# Patient Record
Sex: FEMALE | Race: BLACK OR AFRICAN AMERICAN | NOT HISPANIC OR LATINO | Employment: PART TIME | URBAN - METROPOLITAN AREA
[De-identification: names, ages, dates, MRNs, and addresses within clinical notes are randomized per-mention and may not be internally consistent; named-entity substitution may affect disease eponyms.]

---

## 2023-06-06 ENCOUNTER — LAB (OUTPATIENT)
Dept: LAB | Facility: LAB | Age: 28
End: 2023-06-06
Payer: COMMERCIAL

## 2023-06-06 ENCOUNTER — OFFICE VISIT (OUTPATIENT)
Dept: PRIMARY CARE | Facility: CLINIC | Age: 28
End: 2023-06-06
Payer: COMMERCIAL

## 2023-06-06 VITALS
WEIGHT: 168 LBS | DIASTOLIC BLOOD PRESSURE: 70 MMHG | HEIGHT: 65 IN | HEART RATE: 83 BPM | SYSTOLIC BLOOD PRESSURE: 100 MMHG | BODY MASS INDEX: 27.99 KG/M2 | OXYGEN SATURATION: 98 %

## 2023-06-06 DIAGNOSIS — D64.9 ANEMIA, UNSPECIFIED TYPE: ICD-10-CM

## 2023-06-06 DIAGNOSIS — Z00.00 HEALTH CARE MAINTENANCE: ICD-10-CM

## 2023-06-06 DIAGNOSIS — Z00.00 HEALTH CARE MAINTENANCE: Primary | ICD-10-CM

## 2023-06-06 PROBLEM — R87.610 ASCUS WITH POSITIVE HIGH RISK HPV CERVICAL: Status: ACTIVE | Noted: 2023-06-06

## 2023-06-06 PROBLEM — N89.8 VAGINAL DISCHARGE: Status: ACTIVE | Noted: 2023-06-06

## 2023-06-06 PROBLEM — R39.9 UTI SYMPTOMS: Status: ACTIVE | Noted: 2023-06-06

## 2023-06-06 PROBLEM — R10.2 PELVIC PAIN: Status: ACTIVE | Noted: 2023-06-06

## 2023-06-06 PROBLEM — N93.9 ABNORMAL UTERINE BLEEDING: Status: ACTIVE | Noted: 2023-06-06

## 2023-06-06 PROBLEM — R87.810 ASCUS WITH POSITIVE HIGH RISK HPV CERVICAL: Status: ACTIVE | Noted: 2023-06-06

## 2023-06-06 PROBLEM — N76.0 BACTERIAL VAGINOSIS: Status: ACTIVE | Noted: 2023-06-06

## 2023-06-06 PROBLEM — B97.7 HPV (HUMAN PAPILLOMA VIRUS) INFECTION: Status: ACTIVE | Noted: 2023-06-06

## 2023-06-06 PROBLEM — B96.89 BACTERIAL VAGINOSIS: Status: ACTIVE | Noted: 2023-06-06

## 2023-06-06 LAB
ALANINE AMINOTRANSFERASE (SGPT) (U/L) IN SER/PLAS: 11 U/L (ref 7–45)
ALBUMIN (G/DL) IN SER/PLAS: 4 G/DL (ref 3.4–5)
ALKALINE PHOSPHATASE (U/L) IN SER/PLAS: 34 U/L (ref 33–110)
ANION GAP IN SER/PLAS: 9 MMOL/L (ref 10–20)
ASPARTATE AMINOTRANSFERASE (SGOT) (U/L) IN SER/PLAS: 14 U/L (ref 9–39)
BILIRUBIN TOTAL (MG/DL) IN SER/PLAS: 0.5 MG/DL (ref 0–1.2)
CALCIUM (MG/DL) IN SER/PLAS: 9.4 MG/DL (ref 8.6–10.6)
CARBON DIOXIDE, TOTAL (MMOL/L) IN SER/PLAS: 28 MMOL/L (ref 21–32)
CHLORIDE (MMOL/L) IN SER/PLAS: 105 MMOL/L (ref 98–107)
CHOLESTEROL (MG/DL) IN SER/PLAS: 126 MG/DL (ref 0–199)
CHOLESTEROL IN HDL (MG/DL) IN SER/PLAS: 71 MG/DL
CHOLESTEROL/HDL RATIO: 1.8
COBALAMIN (VITAMIN B12) (PG/ML) IN SER/PLAS: 332 PG/ML (ref 211–911)
CREATININE (MG/DL) IN SER/PLAS: 0.78 MG/DL (ref 0.5–1.05)
ERYTHROCYTE DISTRIBUTION WIDTH (RATIO) BY AUTOMATED COUNT: 14.5 % (ref 11.5–14.5)
ERYTHROCYTE MEAN CORPUSCULAR HEMOGLOBIN CONCENTRATION (G/DL) BY AUTOMATED: 31.2 G/DL (ref 32–36)
ERYTHROCYTE MEAN CORPUSCULAR VOLUME (FL) BY AUTOMATED COUNT: 94 FL (ref 80–100)
ERYTHROCYTES (10*6/UL) IN BLOOD BY AUTOMATED COUNT: 4.14 X10E12/L (ref 4–5.2)
ESTIMATED AVERAGE GLUCOSE FOR HBA1C: 97 MG/DL
FERRITIN (UG/LL) IN SER/PLAS: 69 UG/L (ref 8–150)
FOLATE (NG/ML) IN SER/PLAS: 11.7 NG/ML
GFR FEMALE: >90 ML/MIN/1.73M2
GLUCOSE (MG/DL) IN SER/PLAS: 79 MG/DL (ref 74–99)
HEMATOCRIT (%) IN BLOOD BY AUTOMATED COUNT: 39.1 % (ref 36–46)
HEMOGLOBIN (G/DL) IN BLOOD: 12.2 G/DL (ref 12–16)
HEMOGLOBIN A1C/HEMOGLOBIN TOTAL IN BLOOD: 5 %
IRON (UG/DL) IN SER/PLAS: 104 UG/DL (ref 35–150)
IRON BINDING CAPACITY (UG/DL) IN SER/PLAS: 357 UG/DL (ref 240–445)
IRON SATURATION (%) IN SER/PLAS: 29 % (ref 25–45)
LDL: 45 MG/DL (ref 0–99)
LEUKOCYTES (10*3/UL) IN BLOOD BY AUTOMATED COUNT: 5.7 X10E9/L (ref 4.4–11.3)
MAGNESIUM (MG/DL) IN SER/PLAS: 1.86 MG/DL (ref 1.6–2.4)
NRBC (PER 100 WBCS) BY AUTOMATED COUNT: 0 /100 WBC (ref 0–0)
PLATELETS (10*3/UL) IN BLOOD AUTOMATED COUNT: 189 X10E9/L (ref 150–450)
POTASSIUM (MMOL/L) IN SER/PLAS: 4 MMOL/L (ref 3.5–5.3)
PROTEIN TOTAL: 6.8 G/DL (ref 6.4–8.2)
SODIUM (MMOL/L) IN SER/PLAS: 138 MMOL/L (ref 136–145)
THYROTROPIN (MIU/L) IN SER/PLAS BY DETECTION LIMIT <= 0.05 MIU/L: 1.41 MIU/L (ref 0.44–3.98)
TRIGLYCERIDE (MG/DL) IN SER/PLAS: 49 MG/DL (ref 0–149)
UREA NITROGEN (MG/DL) IN SER/PLAS: 14 MG/DL (ref 6–23)
VLDL: 10 MG/DL (ref 0–40)

## 2023-06-06 PROCEDURE — 36415 COLL VENOUS BLD VENIPUNCTURE: CPT

## 2023-06-06 PROCEDURE — 1036F TOBACCO NON-USER: CPT | Performed by: STUDENT IN AN ORGANIZED HEALTH CARE EDUCATION/TRAINING PROGRAM

## 2023-06-06 PROCEDURE — 84443 ASSAY THYROID STIM HORMONE: CPT

## 2023-06-06 PROCEDURE — 99385 PREV VISIT NEW AGE 18-39: CPT | Performed by: STUDENT IN AN ORGANIZED HEALTH CARE EDUCATION/TRAINING PROGRAM

## 2023-06-06 PROCEDURE — 83550 IRON BINDING TEST: CPT

## 2023-06-06 PROCEDURE — 80061 LIPID PANEL: CPT

## 2023-06-06 PROCEDURE — 83735 ASSAY OF MAGNESIUM: CPT

## 2023-06-06 PROCEDURE — 83036 HEMOGLOBIN GLYCOSYLATED A1C: CPT

## 2023-06-06 PROCEDURE — 82746 ASSAY OF FOLIC ACID SERUM: CPT

## 2023-06-06 PROCEDURE — 82607 VITAMIN B-12: CPT

## 2023-06-06 PROCEDURE — 80053 COMPREHEN METABOLIC PANEL: CPT

## 2023-06-06 PROCEDURE — 82728 ASSAY OF FERRITIN: CPT

## 2023-06-06 PROCEDURE — 83540 ASSAY OF IRON: CPT

## 2023-06-06 PROCEDURE — 85027 COMPLETE CBC AUTOMATED: CPT

## 2023-06-06 ASSESSMENT — ENCOUNTER SYMPTOMS
RESPIRATORY NEGATIVE: 1
MUSCULOSKELETAL NEGATIVE: 1
PSYCHIATRIC NEGATIVE: 1
GASTROINTESTINAL NEGATIVE: 1
CONSTITUTIONAL NEGATIVE: 1
PAIN: 1
CARDIOVASCULAR NEGATIVE: 1
NEUROLOGICAL NEGATIVE: 1
ROS SKIN COMMENTS: ITCHING

## 2023-06-06 NOTE — PROGRESS NOTES
Subjective   Patient ID: Shaka Thompson is a 27 y.o. female.    Patient is a 27-year-old female who presents for establish care.  She regards no past medical history however she states that she has been having itching all over her body.  This is new for her, states that it comes and goes, however has not noticed any other symptoms.  Regards that she does have a family history of chronic kidney disease does have multiple family members who were on dialysis.  She is unsure of the cause of why they need dialysis.  States no additional issues or concerns.    Pain        Review of Systems   Constitutional: Negative.    HENT: Negative.     Respiratory: Negative.     Cardiovascular: Negative.    Gastrointestinal: Negative.    Musculoskeletal: Negative.    Skin:         Itching    Neurological: Negative.    Psychiatric/Behavioral: Negative.         Objective     Vitals:    06/06/23 1310   BP: 100/70   Pulse: 83   SpO2: 98%      Physical Exam  Constitutional:       General: She is not in acute distress.     Appearance: She is not ill-appearing.   HENT:      Mouth/Throat:      Mouth: Mucous membranes are moist.      Pharynx: Oropharynx is clear. No oropharyngeal exudate or posterior oropharyngeal erythema.   Eyes:      Pupils: Pupils are equal, round, and reactive to light.   Cardiovascular:      Rate and Rhythm: Normal rate and regular rhythm.      Heart sounds: No murmur heard.  Pulmonary:      Effort: Pulmonary effort is normal. No respiratory distress.      Breath sounds: No wheezing.   Abdominal:      General: Abdomen is flat. Bowel sounds are normal. There is no distension.      Palpations: Abdomen is soft.      Tenderness: There is no abdominal tenderness.   Musculoskeletal:         General: No tenderness. Normal range of motion.   Skin:     General: Skin is warm and dry.   Neurological:      General: No focal deficit present.      Mental Status: She is alert and oriented to person, place, and time. Mental status  is at baseline.   Psychiatric:         Mood and Affect: Mood normal.         Assessment/Plan   Diagnoses and all orders for this visit:  Health care maintenance  -     CBC; Future  -     Comprehensive Metabolic Panel; Future  -     Hemoglobin A1C; Future  -     TSH with reflex to Free T4 if abnormal; Future  -     Lipid panel; Future  -     Magnesium; Future  -     Ferritin; Future  -     Vitamin B12; Future  -     Folate; Future  Anemia, unspecified type  -     Iron and TIBC; Future    Patient seen for establish care    #Itching  Could be secondary to dermatitis, advised eczema cream, could consider Derm referral check CMP as well as electrolytes B12 iron studies as she does have heavy periods    #Health maintenance  Routine labs  Depression screen negative  Advise age-appropriate vaccinations  Advised follow-up with OB/GYN  We will call with first test results    Return to care yearly or as needed

## 2023-12-22 ENCOUNTER — HOSPITAL ENCOUNTER (EMERGENCY)
Facility: HOSPITAL | Age: 28
Discharge: HOME | End: 2023-12-22
Attending: EMERGENCY MEDICINE

## 2023-12-22 VITALS
HEART RATE: 85 BPM | OXYGEN SATURATION: 98 % | HEIGHT: 65 IN | RESPIRATION RATE: 16 BRPM | TEMPERATURE: 98 F | WEIGHT: 157 LBS | SYSTOLIC BLOOD PRESSURE: 114 MMHG | BODY MASS INDEX: 26.16 KG/M2 | DIASTOLIC BLOOD PRESSURE: 73 MMHG

## 2023-12-22 DIAGNOSIS — R10.30 LOWER ABDOMINAL PAIN: Primary | ICD-10-CM

## 2023-12-22 DIAGNOSIS — K21.9 GASTROESOPHAGEAL REFLUX DISEASE, UNSPECIFIED WHETHER ESOPHAGITIS PRESENT: ICD-10-CM

## 2023-12-22 DIAGNOSIS — N39.0 UTI (URINARY TRACT INFECTION), UNCOMPLICATED: ICD-10-CM

## 2023-12-22 DIAGNOSIS — N76.0 ACUTE VAGINITIS: ICD-10-CM

## 2023-12-22 LAB
APPEARANCE UR: CLEAR
BACTERIA #/AREA URNS AUTO: ABNORMAL /HPF
BILIRUB UR STRIP.AUTO-MCNC: NEGATIVE MG/DL
CLUE CELLS SPEC QL WET PREP: NORMAL
COLOR UR: YELLOW
GLUCOSE UR STRIP.AUTO-MCNC: NEGATIVE MG/DL
KETONES UR STRIP.AUTO-MCNC: NEGATIVE MG/DL
LEUKOCYTE ESTERASE UR QL STRIP.AUTO: ABNORMAL
MUCOUS THREADS #/AREA URNS AUTO: ABNORMAL /LPF
NITRITE UR QL STRIP.AUTO: NEGATIVE
PH UR STRIP.AUTO: 6 [PH]
PREGNANCY TEST URINE, POC: NEGATIVE
PROT UR STRIP.AUTO-MCNC: NEGATIVE MG/DL
RBC # UR STRIP.AUTO: NEGATIVE /UL
RBC #/AREA URNS AUTO: ABNORMAL /HPF
SP GR UR STRIP.AUTO: 1.03
SQUAMOUS #/AREA URNS AUTO: ABNORMAL /HPF
T VAGINALIS SPEC QL WET PREP: NORMAL
UROBILINOGEN UR STRIP.AUTO-MCNC: 2 MG/DL
WBC #/AREA URNS AUTO: ABNORMAL /HPF
WBC VAG QL WET PREP: NORMAL
YEAST VAG QL WET PREP: NORMAL

## 2023-12-22 PROCEDURE — 87210 SMEAR WET MOUNT SALINE/INK: CPT

## 2023-12-22 PROCEDURE — 87086 URINE CULTURE/COLONY COUNT: CPT

## 2023-12-22 PROCEDURE — 87186 SC STD MICRODIL/AGAR DIL: CPT

## 2023-12-22 PROCEDURE — 99283 EMERGENCY DEPT VISIT LOW MDM: CPT

## 2023-12-22 PROCEDURE — 81001 URINALYSIS AUTO W/SCOPE: CPT

## 2023-12-22 PROCEDURE — 2500000001 HC RX 250 WO HCPCS SELF ADMINISTERED DRUGS (ALT 637 FOR MEDICARE OP)

## 2023-12-22 PROCEDURE — 99284 EMERGENCY DEPT VISIT MOD MDM: CPT | Performed by: EMERGENCY MEDICINE

## 2023-12-22 PROCEDURE — 2500000005 HC RX 250 GENERAL PHARMACY W/O HCPCS: Mod: SE

## 2023-12-22 RX ORDER — CEPHALEXIN 500 MG/1
500 CAPSULE ORAL 2 TIMES DAILY
Qty: 14 CAPSULE | Refills: 0 | Status: SHIPPED | OUTPATIENT
Start: 2023-12-22 | End: 2023-12-29

## 2023-12-22 RX ORDER — CEPHALEXIN 250 MG/1
500 CAPSULE ORAL ONCE
Status: COMPLETED | OUTPATIENT
Start: 2023-12-22 | End: 2023-12-22

## 2023-12-22 RX ORDER — ALUMINUM HYDROXIDE, MAGNESIUM HYDROXIDE, AND SIMETHICONE 1200; 120; 1200 MG/30ML; MG/30ML; MG/30ML
10 SUSPENSION ORAL EVERY 12 HOURS PRN
Qty: 355 ML | Refills: 0 | Status: SHIPPED | OUTPATIENT
Start: 2023-12-22 | End: 2024-01-01

## 2023-12-22 RX ORDER — METRONIDAZOLE 500 MG/1
500 TABLET ORAL 2 TIMES DAILY
Qty: 14 TABLET | Refills: 0 | Status: SHIPPED | OUTPATIENT
Start: 2023-12-22 | End: 2023-12-29

## 2023-12-22 RX ORDER — PHENOL/SODIUM PHENOLATE
20 AEROSOL, SPRAY (ML) MUCOUS MEMBRANE DAILY
Qty: 30 TABLET | Refills: 0 | Status: SHIPPED | OUTPATIENT
Start: 2023-12-22

## 2023-12-22 RX ADMIN — DIPHENHYDRAMINE HYDROCHLORIDE AND LIDOCAINE HYDROCHLORIDE AND ALUMINUM HYDROXIDE AND MAGNESIUM HYDRO 10 ML: KIT at 18:33

## 2023-12-22 RX ADMIN — CEPHALEXIN 500 MG: 250 CAPSULE ORAL at 20:30

## 2023-12-22 ASSESSMENT — COLUMBIA-SUICIDE SEVERITY RATING SCALE - C-SSRS
6. HAVE YOU EVER DONE ANYTHING, STARTED TO DO ANYTHING, OR PREPARED TO DO ANYTHING TO END YOUR LIFE?: NO
1. IN THE PAST MONTH, HAVE YOU WISHED YOU WERE DEAD OR WISHED YOU COULD GO TO SLEEP AND NOT WAKE UP?: NO
2. HAVE YOU ACTUALLY HAD ANY THOUGHTS OF KILLING YOURSELF?: NO

## 2023-12-22 ASSESSMENT — PAIN SCALES - GENERAL: PAINLEVEL_OUTOF10: 5 - MODERATE PAIN

## 2023-12-22 ASSESSMENT — PAIN DESCRIPTION - LOCATION: LOCATION: ABDOMEN

## 2023-12-22 ASSESSMENT — PAIN - FUNCTIONAL ASSESSMENT: PAIN_FUNCTIONAL_ASSESSMENT: 0-10

## 2023-12-22 NOTE — ED PROVIDER NOTES
CC: Abdominal Pain and Sore Throat     HPI:   Patient is a 28-year-old female with no significant past medical history presenting due to vaginal discharge and dysuria for the past week.  Patient reports that she was recently on vacation and changed the type of soap that she was using she believes triggered a yeast infection.  She also endorses dysuria, urinary frequency and urgency without noted hematuria the same amount of time.  Patient denies any back pain, fevers or chills, abdominal pain, syncopal episodes, chest pain or shortness of breath.  She does report that since she went on vacation she is also had worsening gas where she has been burping more and had small episode of spit up from reflux.  She reports that when she was pregnant initially she had significant GERD.  Patient has not taken any medications to alleviate her symptoms.  Patient does not have any concern at this time for STDs as she has not been sexually active recently.    Limitations to History: none  Additional History Obtained from: patient alone    PMHx/PSHx:  Per HPI.   - has no past medical history on file.  - has no past surgical history on file.    Social History:  - Tobacco:  reports that she has never smoked. She has never used smokeless tobacco.   - Alcohol:  reports current alcohol use of about 4.0 standard drinks of alcohol per week.   - Drugs:  reports current drug use. Drug: Marijuana.     Medications: Reviewed in EMR.     Allergies:  Patient has no known allergies.    ???????????????????????????????????????????????????????????????  Triage Vitals:  T 36.7 °C (98 °F)  HR 85  /73  RR 16  O2 98 %      Physical Exam  Vitals and nursing note reviewed.   Constitutional:       General: She is not in acute distress.     Appearance: She is well-developed.   HENT:      Head: Normocephalic and atraumatic.      Mouth/Throat:      Mouth: Mucous membranes are dry.      Pharynx: Oropharynx is clear.   Eyes:      Conjunctiva/sclera:  Conjunctivae normal.   Cardiovascular:      Rate and Rhythm: Normal rate and regular rhythm.      Heart sounds: No murmur heard.  Pulmonary:      Effort: Pulmonary effort is normal. No respiratory distress.      Breath sounds: Normal breath sounds. No wheezing, rhonchi or rales.   Abdominal:      General: There is no distension.      Palpations: Abdomen is soft.      Tenderness: There is abdominal tenderness in the suprapubic area. There is no guarding or rebound.   Musculoskeletal:         General: No swelling or tenderness.      Cervical back: Neck supple.   Skin:     General: Skin is warm and dry.      Capillary Refill: Capillary refill takes less than 2 seconds.   Neurological:      Mental Status: She is alert and oriented to person, place, and time.      Sensory: No sensory deficit.      Motor: No weakness.      Gait: Gait normal.         ED Course  ED Course as of 12/24/23 0202   Fri Dec 22, 2023   2007 C. Trachomatis / N. Gonorrhoeae, Amplified Detection [RR]      ED Course User Index  [RR] Tali Choi MD         Diagnoses as of 12/24/23 0202   Lower abdominal pain   UTI (urinary tract infection), uncomplicated   Gastroesophageal reflux disease, unspecified whether esophagitis present   Acute vaginitis       Medical Decision Making:  Patient is a 28-year-old female with no significant past medical history presenting due to multiple medical complaints.  Patient opted to self swab as she is confident that she has a yeast infection likely UTI.  Urinalysis, point-of-care pregnancy test and wet prep were performed with the patient.  She was given BMX for GERD like symptoms and I have low suspicion for any acute chest pain causes for the patient as she is low risk without any significant past medical history.  She denies any diaphoresis, radiation of any chest pain, shortness of breath, nausea or vomiting.  Patient felt significantly improved after given BMX and wanted to wait for results of wet prep.  If  she has yeast infection will be prescribed Diflucan and based on urinalysis, will treat for UTI as apparent. Patient's wet prep was negative however she appears to have evidence of mild UTI and will be treated with Keflex.  Patient was convinced that she has a bacterial infection in her vagina and wanted to be treated for BV.  Patient became unpleasant towards staff and requested speaking to providers multiple times in the room.  Patient was told by attending that she does not have any evidence of BV on the wet prep that was performed by her self swab.  Was reiterated to the patient that she opted to self swab and declined a formal vaginal exam and declined any concern for STDs.  Patient was given metronidazole and Keflex for treatment of possible BV and UTI.  She was given return precautions and discharged in hemodynamically stable condition.    External records reviewed: recent inpatient, clinic, and prior ED notes  Diagnostic imaging independently reviewed/interpreted by me (as reflected in MDM) includes: none  Social Determinants Affecting Care:   Discussion of management with other providers: attending  Prescription Drug Consideration: metronidazole and keflex  Escalation of Care: none    Impression:   UTI    Disposition: Discharge      Procedures ? SmartLinks last updated 12/22/2023 6:26 PM     Tali Choi  PGY-2 Emergency Medicine  [facility]     Tali Choi MD  Resident  12/24/23 0207

## 2023-12-23 LAB — HOLD SPECIMEN: NORMAL

## 2023-12-23 NOTE — DISCHARGE INSTRUCTIONS
You were seen because you had symptoms of reflux and we will start you on a course of medicine to help this.    You also had symptoms of a bacterial infection and will treat this with the antibiotics that we have prescribed.  You did not want to undergo pelvic exam today for additional testing but may follow-up with your primary OB doctor or primary care doctor for further evaluation.    Come back if you do not get better with treatment, have a fever, or new worsening belly pain.

## 2023-12-25 LAB — BACTERIA UR CULT: ABNORMAL

## 2023-12-26 ENCOUNTER — TELEPHONE (OUTPATIENT)
Dept: PHARMACY | Facility: HOSPITAL | Age: 28
End: 2023-12-26

## 2023-12-26 NOTE — PROGRESS NOTES
EDPD Note: Antibiotics Reviewed and Warranted    Contacted Ms. Shaka Thompson regarding a positive urine culture that was taken during their recent emergency room visit. I completed education with patient . The patient is being treated appropriately with Keflex 500 mg BID x 7 days. Patient states her symptoms are resolving and she is doing better. Encouraged patient to finish the full course of medication and follow up with PCP if symptoms worsen. Patient demonstrated understanding and had no further questions.    Susceptibility data from last 90 days.  Collected Specimen Info Organism Ampicillin Cefazolin Cefazolin (uncomplicated UTIs only) Ciprofloxacin Gentamicin Nitrofurantoin Piperacillin/Tazobactam Trimethoprim/Sulfamethoxazole   12/22/23 Urine from Clean Catch/Voided Escherichia coli S S S S S S S S        No further follow up needed from EDPD Team.     Martha Carr, HarrietD